# Patient Record
Sex: MALE | Race: BLACK OR AFRICAN AMERICAN | NOT HISPANIC OR LATINO
[De-identification: names, ages, dates, MRNs, and addresses within clinical notes are randomized per-mention and may not be internally consistent; named-entity substitution may affect disease eponyms.]

---

## 2021-01-28 ENCOUNTER — FORM ENCOUNTER (OUTPATIENT)
Age: 56
End: 2021-01-28

## 2021-01-29 ENCOUNTER — TRANSCRIPTION ENCOUNTER (OUTPATIENT)
Age: 56
End: 2021-01-29

## 2021-01-30 PROBLEM — Z00.00 ENCOUNTER FOR PREVENTIVE HEALTH EXAMINATION: Status: ACTIVE | Noted: 2021-01-30

## 2021-01-31 ENCOUNTER — OUTPATIENT (OUTPATIENT)
Dept: OUTPATIENT SERVICES | Facility: HOSPITAL | Age: 56
LOS: 1 days | End: 2021-01-31
Payer: COMMERCIAL

## 2021-01-31 ENCOUNTER — APPOINTMENT (OUTPATIENT)
Dept: DISASTER EMERGENCY | Facility: HOSPITAL | Age: 56
End: 2021-01-31

## 2021-01-31 VITALS
DIASTOLIC BLOOD PRESSURE: 70 MMHG | RESPIRATION RATE: 18 BRPM | TEMPERATURE: 99 F | OXYGEN SATURATION: 97 % | HEART RATE: 100 BPM | WEIGHT: 266.1 LBS | HEIGHT: 73 IN | SYSTOLIC BLOOD PRESSURE: 140 MMHG

## 2021-01-31 VITALS
OXYGEN SATURATION: 96 % | TEMPERATURE: 99 F | SYSTOLIC BLOOD PRESSURE: 121 MMHG | HEART RATE: 85 BPM | RESPIRATION RATE: 18 BRPM | DIASTOLIC BLOOD PRESSURE: 81 MMHG

## 2021-01-31 DIAGNOSIS — U07.1 COVID-19: ICD-10-CM

## 2021-01-31 PROCEDURE — M0239: CPT

## 2021-01-31 RX ORDER — SODIUM CHLORIDE 9 MG/ML
250 INJECTION INTRAMUSCULAR; INTRAVENOUS; SUBCUTANEOUS
Refills: 0 | Status: DISCONTINUED | OUTPATIENT
Start: 2021-01-31 | End: 2021-02-15

## 2021-01-31 RX ORDER — BAMLANIVIMAB 35 MG/ML
700 INJECTION, SOLUTION INTRAVENOUS ONCE
Refills: 0 | Status: COMPLETED | OUTPATIENT
Start: 2021-01-31 | End: 2021-01-31

## 2021-01-31 RX ADMIN — SODIUM CHLORIDE 25 MILLILITER(S): 9 INJECTION INTRAMUSCULAR; INTRAVENOUS; SUBCUTANEOUS at 15:05

## 2021-01-31 RX ADMIN — BAMLANIVIMAB 270 MILLIGRAM(S): 35 INJECTION, SOLUTION INTRAVENOUS at 14:50

## 2021-01-31 NOTE — CHART NOTE - SYMPTOMS
Fever/Cough/Headache/Muscle Pain not due a chronic condition/Shortness of breath with exertion/Loss of taste / smell

## 2021-01-31 NOTE — CHART NOTE - NSCHARTNOTEFT_GEN_A_CORE
I have reviewed the Bamlanivimab Emergency Use Authorization (EAU) and I have provided the patient or patient's caregiver with the following information:  1. FDA has authorized emergency use of Bamlanivimab, which is not FDA-approved biologic product.  2. The patient or patient's caregiver has the option to accept or refuse administration of Bamlanivimab.  3. The significant known and benefits are unknown.  4. Information on available alternative treatments and risks and benefits of those alternatives.    Discharge:  Patient tolerated infusion well denies complaints of chest pain/SOB/dizzines/ palps  VSS for discharge home  D/C instructions given/ fact sheet included.  Patient to follow-up with PCP as needed.
CC: Monoclonal Antibody Infusion/COVID 19 Positive  55yMale history DM  HTN rports temp 103, malaise, cough, SOB  which began 5 days ago, COVID + PCR 1/28 referred for [facility] infusion    exam/findings:  T(C): 37.1 (01-31-21 @ 14:45), Max: 37.1 (01-31-21 @ 14:45)  HR: 100 (01-31-21 @ 14:45) (100 - 100)  BP: 140/70 (01-31-21 @ 14:45) (140/70 - 140/70)  RR: 18 (01-31-21 @ 14:45) (18 - 18)  SpO2: 97% (01-31-21 @ 14:45) (97% - 97%)      PE:   Appearance: NAD	  HEENT:   Normal oral mucosa,   Lymphatic: No lymphadenopathy  Cardiovascular: Normal S1 S2, No JVD, No murmurs, No edema  Respiratory: Lungs clear to auscultation + cough	  Gastrointestinal:  Soft, Non-tender, + BS	  Skin: warm and dry  Neurologic: Non-focal  Extremities: Normal range of motion,    ASSESSMENT:  Pt is a   55 year old male Covid +  referred by PCP who presents to infusion center for Monoclonal antibody infusion (Bamlanivimab)  Symptoms/ Criteria: temp 103, cough.body aches, malaise  Risk Profile includes: DM, HTN ,BMI > 35    PLAN:  - infusion procedure explained to patient   -Consent for monoclonal antibody infusion obtained   - Risk & benefits discussed/all questions answered  -infuse  Bamlanivimab 700mg  IV over one hour   -observe patient for one hour post infusion    Post infusion   Patient tolerated infusion well, denies complaints of chest pain,SOB,dizziness,palpitations  Vital signs remain stable for discharge home  D/C instructions given / fact sheet reviewed and included with discharge paperwork  Pt verbalizes to seek medical attention if needed  To follow with PMD w/in 1 week

## 2021-02-02 ENCOUNTER — TRANSCRIPTION ENCOUNTER (OUTPATIENT)
Age: 56
End: 2021-02-02